# Patient Record
Sex: FEMALE | Race: WHITE | NOT HISPANIC OR LATINO | Employment: UNEMPLOYED | URBAN - NONMETROPOLITAN AREA
[De-identification: names, ages, dates, MRNs, and addresses within clinical notes are randomized per-mention and may not be internally consistent; named-entity substitution may affect disease eponyms.]

---
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
## 2023-04-19 ENCOUNTER — TELEPHONE (OUTPATIENT)
Dept: CARDIOLOGY | Facility: CLINIC | Age: 23
End: 2023-04-19

## 2023-04-19 NOTE — TELEPHONE ENCOUNTER
Caller: hardik maher    Relationship: Mother    Best call back number: [phone]    What is the best time to reach you: ANYTIME- VOICMAIL    Who are you requesting to speak with (clinical staff, provider,  specific staff member): CLINICAL        What was the call regarding: PATIENT HAS NOT BEEN SEEN IN OFFICE SINCE 2021. SHE WAS SEEING TANIA, BUT WOULD LIKE TO BE SEEN BY MICHAEL MUSA. SHE HAS SVT AND HAS RECENTLY BEEN EXPERIENCING SOME MILD SYMPTOMS. PLEASE REACH OUT FOR SCHEDULING.     Do you require a callback: YES

## 2023-11-08 ENCOUNTER — OFFICE VISIT (OUTPATIENT)
Dept: CARDIOLOGY | Facility: CLINIC | Age: 23
End: 2023-11-08
Payer: COMMERCIAL

## 2023-11-08 VITALS
HEIGHT: 60 IN | WEIGHT: 184.2 LBS | HEART RATE: 120 BPM | OXYGEN SATURATION: 98 % | SYSTOLIC BLOOD PRESSURE: 126 MMHG | DIASTOLIC BLOOD PRESSURE: 82 MMHG | BODY MASS INDEX: 36.16 KG/M2

## 2023-11-08 DIAGNOSIS — R00.2 PALPITATIONS: ICD-10-CM

## 2023-11-08 DIAGNOSIS — R06.02 SHORTNESS OF BREATH: ICD-10-CM

## 2023-11-08 DIAGNOSIS — K62.5 BRBPR (BRIGHT RED BLOOD PER RECTUM): ICD-10-CM

## 2023-11-08 DIAGNOSIS — I47.10 PAROXYSMAL SVT (SUPRAVENTRICULAR TACHYCARDIA): ICD-10-CM

## 2023-11-08 PROCEDURE — 1159F MED LIST DOCD IN RCRD: CPT | Performed by: PHYSICIAN ASSISTANT

## 2023-11-08 PROCEDURE — 93000 ELECTROCARDIOGRAM COMPLETE: CPT | Performed by: PHYSICIAN ASSISTANT

## 2023-11-08 PROCEDURE — 1160F RVW MEDS BY RX/DR IN RCRD: CPT | Performed by: PHYSICIAN ASSISTANT

## 2023-11-08 PROCEDURE — 99214 OFFICE O/P EST MOD 30 MIN: CPT | Performed by: PHYSICIAN ASSISTANT

## 2023-11-08 RX ORDER — PHENTERMINE HYDROCHLORIDE 37.5 MG/1
37.5 CAPSULE ORAL EVERY MORNING
COMMUNITY

## 2023-11-08 RX ORDER — OMEPRAZOLE 20 MG/1
20 CAPSULE, DELAYED RELEASE ORAL DAILY
COMMUNITY
Start: 2023-08-13

## 2023-11-08 NOTE — PROGRESS NOTES
Problem list     Subjective   Martina Jarvis is a 22 y.o. female     Chief Complaint   Patient presents with    Follow-up    Rapid Heart Rate     Patient reports is on adipex   Problem list:  1.  Chronic chest pain.  1.1.  Low risk stress test, 7/2020.  2.  SVT.  2.1.  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  3.  Preserved systolic function.  4.  Anxiety.  5.  ADHD.    HPI  The patient presents in to reestablish cardiovascular care.  She has been seen through the clinic historically because of what eventually was diagnosed as SVT.  She eventually had ablation of the same in August, 2020.  She had done well up until recently.  Unfortunately, the patient started Adipex.  Since starting that medication, she has recurrent episodes of palpitations and tachycardia.  She frequently is tachycardic, again primarily since starting the Adipex.  She has no real dizziness nor syncope associated with this.  Her chest pain is minimal now, and basically chronic and unchanged.  Dyspnea is at baseline.  She has no failure symptoms.  She denies further complaints otherwise.  She does tell me that laboratories have been followed closely with her primary care provider and apparently have been benign.    Current Outpatient Medications on File Prior to Visit   Medication Sig Dispense Refill    levonorgestrel-ethinyl estradiol (SEASONALE) 0.15-0.03 MG per tablet Take 1 tablet by mouth Daily.      naproxen (NAPROSYN) 500 MG tablet Take 1 tablet by mouth Every 12 (Twelve) Hours As Needed. for pain      omeprazole (priLOSEC) 20 MG capsule Take 1 capsule by mouth Daily.      phentermine (Adipex-P) 37.5 MG capsule Take 1 capsule by mouth Every Morning.       No current facility-administered medications on file prior to visit.       Patient has no known allergies.    Past Medical History:   Diagnosis Date    Abnormal ECG 4 years ago    ADD (attention deficit disorder)     ADHD     Anxiety     COVID-19 vaccine administered 1st- 08/2021    2nd -  "10/2021 - Moderna     Depression     SVT (supraventricular tachycardia)        Social History     Socioeconomic History    Marital status: Single   Tobacco Use    Smoking status: Never    Smokeless tobacco: Never   Substance and Sexual Activity    Alcohol use: Never    Drug use: Never    Sexual activity: Not Currently     Partners: Male     Birth control/protection: Birth control pill       Family History   Problem Relation Age of Onset    Diabetes Mother     Hypertension Mother     Bipolar disorder Mother     Hypothyroidism Mother     No Known Problems Father        Review of Systems   Constitutional: Negative.  Negative for chills, diaphoresis, fatigue and fever.   HENT: Negative.     Eyes: Negative.  Negative for visual disturbance.   Respiratory: Negative.  Negative for apnea, cough, chest tightness, shortness of breath and wheezing.    Cardiovascular:  Positive for palpitations. Negative for chest pain and leg swelling.   Gastrointestinal:  Positive for blood in stool. Negative for abdominal pain.   Endocrine: Negative.    Genitourinary: Negative.  Negative for hematuria.   Musculoskeletal:  Positive for back pain. Negative for arthralgias, myalgias, neck pain and neck stiffness.   Skin: Negative.  Negative for rash and wound.   Allergic/Immunologic: Negative.  Negative for environmental allergies and food allergies.   Neurological: Negative.  Negative for dizziness, syncope, weakness, light-headedness, numbness and headaches.   Hematological: Negative.  Does not bruise/bleed easily.   Psychiatric/Behavioral: Negative.  Negative for sleep disturbance.        Objective   Vitals:    11/08/23 1529   BP: 126/82   BP Location: Left arm   Patient Position: Sitting   Pulse: 120   SpO2: 98%   Weight: 83.6 kg (184 lb 3.2 oz)   Height: 152.4 cm (60\")      /82 (BP Location: Left arm, Patient Position: Sitting)   Pulse 120   Ht 152.4 cm (60\")   Wt 83.6 kg (184 lb 3.2 oz)   SpO2 98%   BMI 35.97 kg/m²    Lab " Results (most recent)       None          Physical Exam  Vitals and nursing note reviewed.   Constitutional:       General: She is not in acute distress.     Appearance: She is well-developed.   HENT:      Head: Normocephalic and atraumatic.   Eyes:      Conjunctiva/sclera: Conjunctivae normal.      Pupils: Pupils are equal, round, and reactive to light.   Neck:      Vascular: No JVD.      Trachea: No tracheal deviation.   Cardiovascular:      Rate and Rhythm: Normal rate and regular rhythm.      Heart sounds: Normal heart sounds.   Pulmonary:      Effort: Pulmonary effort is normal.      Breath sounds: Normal breath sounds.   Abdominal:      General: Bowel sounds are normal. There is no distension.      Palpations: Abdomen is soft. There is no mass.      Tenderness: There is no abdominal tenderness. There is no guarding or rebound.   Musculoskeletal:         General: No tenderness or deformity. Normal range of motion.      Cervical back: Normal range of motion and neck supple.   Skin:     General: Skin is warm and dry.      Coloration: Skin is not pale.      Findings: No erythema or rash.   Neurological:      Mental Status: She is alert and oriented to person, place, and time.   Psychiatric:         Behavior: Behavior normal.         Thought Content: Thought content normal.         Judgment: Judgment normal.           Procedure     ECG 12 Lead    Date/Time: 11/8/2023 3:34 PM  Performed by: Sabas Rodriguez PA    Authorized by: Sabas Rodriguez PA  Comparison: compared with previous ECG from 6/4/2021             Assessment & Plan      Diagnosis Plan   1. Palpitations  Adult Transthoracic Echo Complete W/ Cont if Necessary Per Protocol    Holter Monitor - 72 Hour Up To 15 Days      2. Paroxysmal SVT (supraventricular tachycardia)  Adult Transthoracic Echo Complete W/ Cont if Necessary Per Protocol    Holter Monitor - 72 Hour Up To 15 Days      3. Shortness of breath  Adult Transthoracic Echo Complete W/ Cont if  Necessary Per Protocol    Holter Monitor - 72 Hour Up To 15 Days      4. BRBPR (bright red blood per rectum)  Ambulatory Referral to Gastroenterology        1.  The patient presents back for repeat evaluation.  She reports increasing palpitations and tachycardia.  EKG today indicates sinus tachycardia.  She frequently has tachycardia when checked at home.  Unfortunately, this basically only started once starting Adipex.  We encouraged her that we would be very cautious to continue Adipex.  She wants to discuss this with her primary care provider however.    2.  I would like to schedule for repeat cardiac evaluation.  We will schedule for 72-hour Holter monitor to evaluate for further rate or rhythm disturbance issues, particularly given history of SVT post ablation.    3.  Echocardiogram will be performed to evaluate LV size and function, valvular morphologies, and cardiac structure otherwise.    4.  For now we will continue medications.  We did encourage discontinuation of Adipex.  Outside of that I would make no adjustments.    5.  As we know study results as above, we can see the patient back and recommend further.  She will call for issues prior to follow-up.    6.  Not noted above, the patient has had bright red blood in bowel movements as of recent.  She has requested referral to GI services.  If okay with her primary care provider, we will attempt to arrange this to expedite care.                 Electronically signed by:

## 2023-12-18 ENCOUNTER — TELEPHONE (OUTPATIENT)
Dept: CARDIOLOGY | Facility: CLINIC | Age: 23
End: 2023-12-18
Payer: COMMERCIAL

## 2023-12-18 DIAGNOSIS — R06.02 SHORTNESS OF BREATH: Primary | ICD-10-CM

## 2023-12-18 DIAGNOSIS — R00.2 PALPITATIONS: ICD-10-CM

## 2023-12-18 DIAGNOSIS — R42 DIZZINESS: ICD-10-CM

## 2023-12-18 DIAGNOSIS — I47.10 PAROXYSMAL SVT (SUPRAVENTRICULAR TACHYCARDIA): ICD-10-CM

## 2023-12-18 NOTE — TELEPHONE ENCOUNTER
Caller: Martina Jarvis    Relationship: Self    Best call back number: [phone]     What is the best time to reach you: ANYTIME     What was the call regarding: PT IS WONDERING IF THE OFFICE HAS RECEIVED HER BLOOD WORK AND X RAYS RESULTS FROM [facility]. PLEASE ADVISE. STATES A FEW MINS BEFORE SHE CALLED SHE FELT DIZZY AND HAD SOB. FELT AS IF SHE WOULD PASS OUT OF HAVE A HEART ATTACK- STATES THIS ISN'T ACTIVE AS OF PHONE CALL.     Is it okay if the provider responds through MyChart: CALL

## 2023-12-18 NOTE — TELEPHONE ENCOUNTER
Hub staff attempted to follow warm transfer process and was unsuccessful     Caller: Martina Jarvis     Relationship to patient: Self     Best call back number: [phone]     Patient is needing: PT STATES SHE CAN'T WAIT FOR HER ECHO, NEEDS THIS TODAY. STATES LIKE SHE FEELS LIKE SHE IS GOING TO DIE OR HAVE A HEART ATTACK. HER HEART STOPS AND STARTS  BEATING AGAIN. PT HAS BEEN COUGHING A LOT . HAVING SOB. PT DOESN'T KNOW IF SHE HAS HAD THIS BEFORE, STATES SHE HAS HAD TACHYCARDIA IN THE PAST. PT WILL NOT GO TO THE ER. TRANSFERRED PT TO THE [facility] TEAM. NO ANSWER AT THE [facility]. TRANSFERRED PT TO VOICEMAIL DUE TO NO ANSWER AND IT FORCING US TO LEAVE A MESSAGE.

## 2023-12-18 NOTE — TELEPHONE ENCOUNTER
Patient stated that she never received a call about her monitor results. Patient would like a call.

## 2023-12-18 NOTE — TELEPHONE ENCOUNTER
She feels like her heart has stops for a few seconds . She has cough and symptoms she went to the DR and they refused to test her for Covid. She feels like she can not breath. She wants testing done ASAP. She feels like she needs to see someone today feels like it is a emergency. She is having pain when she coughs or clears her throat.

## 2023-12-18 NOTE — TELEPHONE ENCOUNTER
I spoke with Sabas OQUENDO he would like for the Patient to get  CBC CMP D-Dimer and a chest x-ray. I called patient and had to leave a message .

## 2023-12-18 NOTE — TELEPHONE ENCOUNTER
I called patient and let her know that over all her labs were ok . I also let patient know that according to Chest x ray no lung disease or pleural disease. I will call her tomorrow if I get her Echo report.

## 2023-12-18 NOTE — TELEPHONE ENCOUNTER
Caller: Martina Jarvis    Relationship: Self    Best call back number: [phone]    What orders are you requesting (i.e. lab or imaging):XRAY WORK ORDERS    In what timeframe would the patient need to come in: ASAP    Where will you receive your lab/imaging services: [facility]     Additional notes: PATIENT HAS HAD BLOOD WORK DONE HOWEVER THE XRAY ORDERS WERE NOT SIGNED AND NEED GALEN FAXED OVER [phone]

## 2023-12-19 ENCOUNTER — TELEPHONE (OUTPATIENT)
Dept: CARDIOLOGY | Facility: CLINIC | Age: 23
End: 2023-12-19
Payer: COMMERCIAL

## 2023-12-19 NOTE — TELEPHONE ENCOUNTER
Notified pt of her results and AZRA Vazquez's recommendations. Pt verbalizes understanding and is aware that we will call when we receive the rest of her results. Pt is aware to call with any questions or concerns.      Faxed results to pcp     ----- Message from Carolin Lara Rep sent at 12/19/2023 10:02 AM EST -----    ----- Message -----  From: Sabas Rodriguez PA  Sent: 12/18/2023   7:36 PM EST  To: Hawa Bobo MA    No acute findings.  Routine follow-up.  ----- Message -----  From: Shannon Peguero RegSched Rep  Sent: 12/18/2023   1:47 PM EST  To: AZRA Rodriguez

## 2023-12-21 ENCOUNTER — NURSE TRIAGE (OUTPATIENT)
Dept: CALL CENTER | Facility: HOSPITAL | Age: 23
End: 2023-12-21
Payer: COMMERCIAL

## 2023-12-21 ENCOUNTER — TELEPHONE (OUTPATIENT)
Dept: CARDIOLOGY | Facility: CLINIC | Age: 23
End: 2023-12-21
Payer: COMMERCIAL

## 2023-12-21 NOTE — TELEPHONE ENCOUNTER
Called patient and advised she proceed to the ER for immediate evaluation and treatment due to symptoms and sounding as if she was in distress. She reported O2 at 70. She was seen last week at [facility] with Covid. She would like to be seen today by Sabas Rodriguez PA-C as Sabas told her she can come in anytime to be seen. Explained Sabas is currently  out of the office. Tried explaining she proceed to the ER however she just kept explaining why she did not like [facility]. I urged she be treated at any ER not necessarily [facility], she can go to any hospital she chooses. She continued to discuss being exposed to Covid again and how her friend should have told her. Explained I hated what she was going through unfortunately she needed to go to the ER for treatment. Phone was then disconnected.

## 2023-12-21 NOTE — TELEPHONE ENCOUNTER
"Reason for Disposition   Dizziness, lightheadedness, or weakness    Additional Information   Negative: Passed out (i.e., lost consciousness, collapsed and was not responding)   Negative: Shock suspected (e.g., cold/pale/clammy skin, too weak to stand, low BP, rapid pulse)   Negative: Difficult to awaken or acting confused (e.g., disoriented, slurred speech)   Negative: Visible sweat on face or sweat dripping down face   Negative: Unable to walk, or can only walk with assistance (e.g., requires support)   Negative: Received SHOCK from implantable cardiac defibrillator and has persisting symptoms (i.e., palpitations, lightheadedness)   Negative: Dizziness, lightheadedness, or weakness and heart beating very rapidly (e.g., > 140 / minute)   Negative: Dizziness, lightheadedness, or weakness and heart beating very slowly (e.g., < 50 / minute)   Negative: Sounds like a life-threatening emergency to the triager   Negative: Chest pain   Negative: Difficulty breathing   Negative: Heart beating very rapidly (e.g., > 140 / minute) and present now  (Exception: During exercise.)   Negative: Heart beating very slowly (e.g., < 50 / minute)  (Exception: Athlete and heart rate normal for caller.)   Negative: New or worsened shortness of breath with activity (dyspnea on exertion)   Negative: Patient sounds very sick or weak to the triager   Negative: Wearing a 'Holter monitor' or 'cardiac event monitor'   Negative: Received SHOCK from implantable cardiac defibrillator (and now feels well)   Negative: Heart beating very rapidly (e.g., > 140 / minute) and not present now  (Exception: During exercise.)   Negative: Skipped or extra beat(s) and increases with exercise or exertion   Negative: Skipped or extra beat(s) and occurs 4 or more times per minute    Answer Assessment - Initial Assessment Questions  1. DESCRIPTION: \"Please describe your heart rate or heartbeat that you are having\" (e.g., fast/slow, regular/irregular, skipped or " "extra beats, \"palpitations\")      Hx of PSVT. HR today  . BP  135/116 reported by caller. She is hyperventilating on the phone.  States her SPO2 pulse ox dropped to 70% and then it come up to 90's then drops back down.  Refusing ER states they do not help her.  Needs to speak with cardiology and need them to call her ASAP.   2. ONSET: \"When did it start?\" (Minutes, hours or days)       Since she got up today, but see caller having multiple calls to office with similar complaints over the past few days.    3. DURATION: \"How long does it last\" (e.g., seconds, minutes, hours)      minutes  4. PATTERN \"Does it come and go, or has it been constant since it started?\"  \"Does it get worse with exertion?\"   \"Are you feeling it now?\"      intermittent  5. TAP: \"Using your hand, can you tap out what you are feeling on a chair or table in front of you, so that I can hear?\" (Note: not all patients can do this)        Regular at present  6. HEART RATE: \"Can you tell me your heart rate?\" \"How many beats in 15 seconds?\"  (Note: not all patients can do this)         120   7. RECURRENT SYMPTOM: \"Have you ever had this before?\" If Yes, ask: \"When was the last time?\" and \"What happened that time?\"       Yes she has known PSVT  8. CAUSE: \"What do you think is causing the palpitations?\"      PSVT  9. CARDIAC HISTORY: \"Do you have any history of heart disease?\" (e.g., heart attack, angina, bypass surgery, angioplasty, arrhythmia)       Called her doctor 12/18 and stated she felt like she was going to die or have a heart attack. .   10. OTHER SYMPTOMS: \"Do you have any other symptoms?\" (e.g., dizziness, chest pain, sweating, difficulty breathing)        ADHD, Anxiety disorder  11. PREGNANCY: \"Is there any chance you are pregnant?\" \"When was your last menstrual period?\"       Denies    Protocols used: Heart Rate and Heartbeat Questions-ADULT-[place]    "

## 2023-12-21 NOTE — TELEPHONE ENCOUNTER
I called and had to leave a message on voicemail . I advised based on blood pressure and heart rate patient will need to go to the ER.I advised that she may need medications to bring down blood pressure that we can not give in the office. She will also need O2 levels monitored if they are dropping.

## 2023-12-21 NOTE — TELEPHONE ENCOUNTER
Caller: Martina Jarvis    Relationship: Self    Best call back number: [phone]    Caller requesting test results: MARTINA JARVIS    What test was performed: ECHO AND D-DIMER LAB RESULTS     When was the test performed: 12-19 AND BLOOD WORK 12-18-23    Where was the test performed: [facility] AND [facility]    Additional notes: PATIENT CALLED UPSET THAT SOMEONE SHE HAS BEEN AROUND HAS TESTED POSITIVE FOR COVID AND IS WORRIED ABOUT GETTING SICK.  PLEASE REACH OUT WHEN RESULTS ARE AVAILABLE.

## 2023-12-21 NOTE — TELEPHONE ENCOUNTER
Hub staff attempted to follow warm transfer process and was unsuccessful     Caller: Martina Jarvis    Relationship to patient: Self    Best call back number: [phone]    Patient is needing: PT STATES SHE IS NOT GOING TO THE HOS, IS REQUESTING TO BE SEEN TODAY. STATES THE OXYGEN LEVEL KEEPS JUMPING AND HER BP /116 PULSE 120. CALLED OFFICE, NO ANSWER. CALLED [facility] SINCE THE PT REFUSED TO GO TO THE ER. PLEASE ADVISE.

## 2023-12-21 NOTE — TELEPHONE ENCOUNTER
Left message that d-dimer was normal however it does not show an echo being done, only other testing was chest x ray. Echo was ordered by Sabas Rodriguez PA-C on 11/8/23. Chart shows cancelled. If echo was done at [facility] the results are not available at this time.     Patient returned call and notified of above. She does not see why we can not get the echo results. Advised she call [facility] and have echo faxed when available.

## 2023-12-21 NOTE — TELEPHONE ENCOUNTER
Caller: Martina Jarvis    Relationship: Self    Best call back number: [phone]    What is the best time to reach you: ANY    What was the call regarding: PATIENT ASKING WHAT LEVELS MEAN ON A PULSE OXY METER. PLEASE CALL ASAP SHE SOUNDS VERY CONCERNED    Is it okay if the provider responds through MyChart: NO

## 2023-12-22 ENCOUNTER — OFFICE VISIT (OUTPATIENT)
Dept: CARDIOLOGY | Facility: CLINIC | Age: 23
End: 2023-12-22
Payer: COMMERCIAL

## 2023-12-22 VITALS — OXYGEN SATURATION: 98 % | DIASTOLIC BLOOD PRESSURE: 82 MMHG | HEART RATE: 88 BPM | SYSTOLIC BLOOD PRESSURE: 122 MMHG

## 2023-12-22 DIAGNOSIS — R00.0 TACHYCARDIA: ICD-10-CM

## 2023-12-22 DIAGNOSIS — R00.2 PALPITATIONS: Primary | ICD-10-CM

## 2023-12-22 DIAGNOSIS — R06.02 SHORTNESS OF BREATH: ICD-10-CM

## 2023-12-22 PROCEDURE — 99213 OFFICE O/P EST LOW 20 MIN: CPT | Performed by: PHYSICIAN ASSISTANT

## 2023-12-22 PROCEDURE — 1159F MED LIST DOCD IN RCRD: CPT | Performed by: PHYSICIAN ASSISTANT

## 2023-12-22 PROCEDURE — 1160F RVW MEDS BY RX/DR IN RCRD: CPT | Performed by: PHYSICIAN ASSISTANT

## 2023-12-22 RX ORDER — CEFDINIR 300 MG/1
300 CAPSULE ORAL 2 TIMES DAILY
COMMUNITY
Start: 2023-12-13

## 2023-12-22 NOTE — PROGRESS NOTES
Problem list     Subjective   Martina Jarvis is a 23 y.o. female     Chief Complaint   Patient presents with    Palpitations   Problem list:  1.  Chronic chest pain.  1.1.  Low risk stress test, 7/2020.  2.  SVT.  2.1.  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  3.  Preserved systolic function.  4.  Anxiety.  5.  ADHD.    HPI  The patient presents in clinic today at her request.  She was seen at last evaluation because of recurrent episodes of palpitations and tachycardia.  At that time, Holter monitor was performed.  Holter suggested sinus rhythm with intermittent episodes of sinus tachycardia, PACs, and PVCs.  No significant nor sustained dysrhythmic activity was noted.  She was scheduled for an echocardiogram as well.  Echocardiogram was performed at the hospital.  This indicated preserved systolic function with no significant valvular nor structural abnormalities.  At this time, the patient does not feel well.  She has ongoing episodes of tachycardia and palpitations.  She is very concerned that this is reflective of SVT which has been noticed in the past.  She is very concerned however because she did not have symptoms during her Holter monitor.  She would now like to pursue further evaluation.  She would like to discuss an event monitor as she has had this in the past.  She did have laboratories, where CBC, CMP, and D-dimer findings were benign.  She tells me that recent thyroid studies have been benign as well.  She has no further complaints and presents for further evaluation because of ongoing symptoms.    Current Outpatient Medications on File Prior to Visit   Medication Sig Dispense Refill    cefdinir (OMNICEF) 300 MG capsule Take 1 capsule by mouth 2 (Two) Times a Day.      levonorgestrel-ethinyl estradiol (SEASONALE) 0.15-0.03 MG per tablet Take 1 tablet by mouth Daily.       No current facility-administered medications on file prior to visit.       Patient has no known allergies.    Past Medical History:    Diagnosis Date    Abnormal ECG 4 years ago    ADD (attention deficit disorder)     ADHD     Anxiety     COVID-19 12/08/2023    COVID-19 vaccine administered 1st- 08/2021    2nd - 10/2021 - Moderna     Depression     SVT (supraventricular tachycardia)        Social History     Socioeconomic History    Marital status: Single   Tobacco Use    Smoking status: Never    Smokeless tobacco: Never   Substance and Sexual Activity    Alcohol use: Never    Drug use: Never    Sexual activity: Not Currently     Partners: Male     Birth control/protection: Birth control pill       Family History   Problem Relation Age of Onset    Diabetes Mother     Hypertension Mother     Bipolar disorder Mother     Hypothyroidism Mother     No Known Problems Father        Review of Systems   Constitutional:  Positive for fatigue. Negative for chills, diaphoresis and fever.   HENT: Negative.     Eyes: Negative.  Negative for visual disturbance.   Respiratory:  Negative for apnea, cough, chest tightness, shortness of breath and wheezing.    Cardiovascular:  Positive for palpitations. Negative for chest pain.   Gastrointestinal: Negative.  Negative for abdominal pain and blood in stool.   Endocrine: Negative.    Genitourinary: Negative.  Negative for hematuria.   Musculoskeletal: Negative.  Negative for arthralgias, back pain, myalgias, neck pain and neck stiffness.   Skin: Negative.  Negative for rash and wound.   Allergic/Immunologic: Negative.  Negative for environmental allergies and food allergies.   Neurological:  Positive for light-headedness. Negative for dizziness, syncope, weakness and headaches.   Hematological: Negative.  Does not bruise/bleed easily.   Psychiatric/Behavioral: Negative.  Negative for sleep disturbance.        Objective   Vitals:    12/22/23 1042   BP: 122/82   Pulse: 88   SpO2: 98%      /82   Pulse 88   SpO2 98%    Lab Results (most recent)       None          Physical Exam  Vitals and nursing note reviewed.    Constitutional:       General: She is not in acute distress.     Appearance: She is well-developed.   HENT:      Head: Normocephalic and atraumatic.   Eyes:      Conjunctiva/sclera: Conjunctivae normal.      Pupils: Pupils are equal, round, and reactive to light.   Neck:      Vascular: No JVD.      Trachea: No tracheal deviation.   Cardiovascular:      Rate and Rhythm: Normal rate and regular rhythm.      Heart sounds: Normal heart sounds.   Pulmonary:      Effort: Pulmonary effort is normal.      Breath sounds: Normal breath sounds.   Abdominal:      General: Bowel sounds are normal. There is no distension.      Palpations: Abdomen is soft. There is no mass.      Tenderness: There is no abdominal tenderness. There is no guarding or rebound.   Musculoskeletal:         General: No tenderness or deformity. Normal range of motion.      Cervical back: Normal range of motion and neck supple.   Skin:     General: Skin is warm and dry.      Coloration: Skin is not pale.      Findings: No erythema or rash.   Neurological:      Mental Status: She is alert and oriented to person, place, and time.   Psychiatric:         Behavior: Behavior normal.         Thought Content: Thought content normal.         Judgment: Judgment normal.           Procedure   Procedures       Assessment & Plan      Diagnosis Plan   1. Palpitations  Cardiac Event Monitor      2. Shortness of breath        3. Tachycardia          1.  At this time, the patient presents for further evaluation.  She is very concerned about ongoing tachycardia and palpitations.  This occurs more frequently than what she has noted in the past.  Symptoms are becoming very disturbing for the patient.  We did recently do a Holter monitor which indicated no significant rate nor rhythm disturbance issues.  She is not satisfied with this however because she had no reproduction of symptoms of any significance while wearing the Holter.  She will be scheduled for an extended event  monitor to evaluate for any potential dysrhythmic activity.  Her main concern is SVT, as this has been an issue for her in the past.    2.  As we can review event monitor findings, we can recommend her further.    3.  Recent echocardiogram findings were very much benign, as above.  Laboratories that I have available have been unremarkable as well.  We have requested all recent laboratories.    4.  For recurrent issues, she will proceed onto the emergency room.  As we can review the above, we can recommend her further.                            Electronically signed by:

## 2023-12-27 DIAGNOSIS — R00.2 PALPITATIONS: ICD-10-CM

## 2023-12-27 DIAGNOSIS — I47.10 PAROXYSMAL SVT (SUPRAVENTRICULAR TACHYCARDIA): ICD-10-CM

## 2023-12-27 DIAGNOSIS — R06.02 SHORTNESS OF BREATH: ICD-10-CM

## 2024-01-02 ENCOUNTER — TELEPHONE (OUTPATIENT)
Dept: CARDIOLOGY | Facility: CLINIC | Age: 24
End: 2024-01-02
Payer: COMMERCIAL

## 2024-01-02 NOTE — TELEPHONE ENCOUNTER
----- Message from Carolin Lara sent at 1/2/2024 10:01 AM EST -----    ----- Message -----  From: Melina Judd APRN  Sent: 1/1/2024   3:17 PM EST  To: Hong Newby [facility]    No significant findings on echo.  Event monitor pending.

## 2024-01-02 NOTE — TELEPHONE ENCOUNTER
Called and informed pt of results, she stated she has not worn monitor yet. She was concerned about getting it wet, I explained that she can get it wet, just not submerge it. Pt stated she would get it put on.

## 2024-01-03 ENCOUNTER — TELEPHONE (OUTPATIENT)
Dept: CARDIOLOGY | Facility: CLINIC | Age: 24
End: 2024-01-03
Payer: COMMERCIAL

## 2024-01-03 NOTE — TELEPHONE ENCOUNTER
----- Message from Carolin Lara Rep sent at 1/3/2024 10:51 AM EST -----    ----- Message -----  From: Meg Gleason RegSched Rep  Sent: 12/27/2023   3:26 PM EST  To: CHRISTEL Ovalle      ----- Message -----  From: Sabas Rodriguez PA  Sent: 12/26/2023  10:38 AM EST  To: [facility]ees[facility]    D-dimer unremarkable.  ----- Message -----  From: Shannon Peguero RegSched Rep  Sent: 12/18/2023   3:11 PM EST  To: AZRA Rodriguez

## 2024-01-25 ENCOUNTER — TELEPHONE (OUTPATIENT)
Dept: UROLOGY | Facility: CLINIC | Age: 24
End: 2024-01-25
Payer: COMMERCIAL

## 2024-01-25 NOTE — TELEPHONE ENCOUNTER
Caller: VIKY ROBLES    Relationship: SELF    Best call back number: [phone]    What is your medical concern? PT SAID SHE IS HAVING PAIN AGAIN ASKING TO GET IN IMMEDIATELY NEXT WEEK. PT SAID SHE IS VERY WORRIED ABOUT WHAT COULD BE WRONG WITH HER.    How long has this issue been going on? A MONTH    Is your provider already aware of this issue? NO    Have you been treated for this issue? NO

## 2024-01-29 ENCOUNTER — TELEPHONE (OUTPATIENT)
Dept: CARDIOLOGY | Facility: CLINIC | Age: 24
End: 2024-01-29
Payer: COMMERCIAL

## 2024-01-29 DIAGNOSIS — G47.39 SLEEP APNEA-LIKE BEHAVIOR: Primary | ICD-10-CM

## 2024-01-29 NOTE — TELEPHONE ENCOUNTER
Patient notified of recommendation's, (patient reports that she has her monitor that was last ordered and has not worn it yet). Patient notified of recommendation's and request to be referred to Dr. Garcia for sleep apnea evaluation. Order placed.

## 2024-01-29 NOTE — TELEPHONE ENCOUNTER
----- Message from AZRA Rodriguez sent at 1/29/2024  9:07 AM EST -----  As per recommendations.  ----- Message -----  From: Los Newby MD  Sent: 1/26/2024   5:35 PM EST  To: AZRA Rodriguez    Mobile Cardiac Outpatient Telemetry  Order: [id number]  Status: Final result       Visible to patient: Yes (not seen)       Dx: Palpitations    0 Result Notes  Details    Reading Physician Reading Date Result Priority   Los Newby MD  [phone] 1/26/2024 Routine     Result Text  1.  Sinus rhythm is the baseline predominant rhythm throughout the study.  2.  An episode of nonsustained V. tach was noted at 8:33 AM on 1/7/2024.  It is unclear if this correlates to sleep hours for the patient overnight, but if the patient was possibly sleeping, consider evaluation of sleep apnea.  The episode of nonsustained V. tach persisted for 8 beats at a rate of 172 bpm.  3.  No other arrhythmia, ectopy, or block of significance was noted during the study.  4.  No specific symptoms were reported during the study, however there were 2 manually detected events noted during the study.  Both occurred during sinus tachycardia.         .  > WC- bound. PPSV 40%  > Pls assist with ADL's  > Skin care as per protocol

## 2024-01-30 ENCOUNTER — TELEPHONE (OUTPATIENT)
Dept: CARDIOLOGY | Facility: CLINIC | Age: 24
End: 2024-01-30

## 2024-01-30 NOTE — TELEPHONE ENCOUNTER
Caller: Martina Jarvis    Relationship: Self    Best call back number: [phone]     What is the best time to reach you: ANYTIME     Who are you requesting to speak with (clinical staff, provider,  specific staff member): CLINICAL     What was the call regarding: PT WAS TOLD SHE HAS SLEEP APNEA, SHE WAS UPSET AND CRYING LAST NIGHT, IS REQUESTING TO SPEAK WITH A NURSE TODAY. STATES SHE KNOWS THAT SHE DOES NOT HAVE SLEEP APNEA. IS REQUESTING TO RE WEAR THE HEART MONITOR AGAIN. PT HAD COVID WHEN SHE WORE THE MONITOR, THINKS THIS IS WHY THIS IS WHY THIS SHOWED UP.     Is it okay if the provider responds through MyChart: CALL

## 2024-01-30 NOTE — TELEPHONE ENCOUNTER
Called patient and explained I was in the room when she spoke to Sabas' nurse regarding monitor results and referral. Patient was not diagnosed with sleep apnea and was not told she has sleep apnea. She was told she is being referred to a pulmonologist for sleep apnea evaluation based on monitor results and symptoms. Having Covid while wearing the monitor does not change the fact that she needs pulmonologist evaluation based on results.     She was sent another monitor as requested but never donned the monitor and advised to return as we were able to obtain enough information from the monitor she did wear.

## 2024-02-01 ENCOUNTER — TELEPHONE (OUTPATIENT)
Dept: CARDIOLOGY | Facility: CLINIC | Age: 24
End: 2024-02-01
Payer: COMMERCIAL

## 2024-02-01 NOTE — TELEPHONE ENCOUNTER
Caller: Martina Jarvis    Relationship to patient: Self    Best call back number: [phone]    Patient is needing: IS CARDIAC CLEARANCE FOR HER UPCOMING PROCEDURES PLEASE ADVISE THANK YOU

## 2024-02-02 NOTE — TELEPHONE ENCOUNTER
Patient notified to contact surgeon's office to have clearance faxed. She states she has been unable to reach Dr Bender's office (urology). Called Dr Bender's office to have clearance faxed. Was advised clearance was not required with cystoscopy.

## 2024-02-07 ENCOUNTER — TELEPHONE (OUTPATIENT)
Dept: CARDIOLOGY | Facility: CLINIC | Age: 24
End: 2024-02-07
Payer: COMMERCIAL

## 2024-02-07 NOTE — TELEPHONE ENCOUNTER
Called to speak with the pt and verify if I can be of assistance in addressing her concerns with AZRA Vazquez since he is performing patient care and unable to speak via phone at this time.  Pt reports she is scheduled for [facility] B[facility], she reports only taking birth control and probiotics and is also scheduled for a Colonscopy on 2/28/24 @ [facility] by  and she was needing to confirm if she is okay to proceed with these procedures.    Spoke with AZRA Vazquez and he states from a cardiac perspective there is not indication the patient is at an increased risk for either of these procedures.  Notified pt, repeated and verified.

## 2024-02-07 NOTE — TELEPHONE ENCOUNTER
Caller: Martina Jarvis    Relationship: Self    Best call back number: [phone]     What is the best time to reach you: ANYTIME     Who are you requesting to speak with (clinical staff, provider,  specific staff member): AZRA RUCKER     What was the call regarding: CALLED THIS WEEK ABOUT THIS TWICE, STATES SHE DOESN'T FEEL AS IF OUR OFFICE IS CONCERNED WITH HER NEEDS.     STATES SHE IS HAVING SURG NEXT WEEK, REQUESTING A CALL BACK FROM AZRA MATTHEWS SPECIFICALLY TO DISCUSS HER BEING CLEAR FOR THIS.     DR DIDN'T REQUEST CLEARANCE, PT JUST WANTS TO MAKE SURE SHE IS OKAY. PLEASE ADVISE PT ASAP.     Is it okay if the provider responds through MyChart: CALL

## 2024-04-04 ENCOUNTER — TELEPHONE (OUTPATIENT)
Dept: CARDIOLOGY | Facility: CLINIC | Age: 24
End: 2024-04-04
Payer: COMMERCIAL

## 2024-04-04 NOTE — TELEPHONE ENCOUNTER
Called patient who reports started contrave for weight loss and is on zofran and was told by her mother that zofran should not be taken every day due to can affect the heart, advised patient to contact provider whom prescribed zofran and contrave. She reports increase in heart rate when exercising, instructed patient that when exercising heart rate will go up and if it is bothersome she may want to do more low intensity exercises. Verbalizes understanding.

## 2024-04-04 NOTE — TELEPHONE ENCOUNTER
Caller: Martina Jarvis    Relationship to patient: Self    Best call back number: [phone]    Patient is needing: TO DISCUSS WEIGHT LOSS RX'S, WHILE TAKING ZOFRAN WITH IT CAN CAUSE PROBLEMS CAN SHE TAKE THIS RX EVERYDAY? PT STATES HER HEART HAS BEEN RACING SINCE EXERCISING NOW. PLEASE ADVISE THANK YOU

## 2024-07-10 ENCOUNTER — OFFICE VISIT (OUTPATIENT)
Dept: CARDIOLOGY | Facility: CLINIC | Age: 24
End: 2024-07-10
Payer: COMMERCIAL

## 2024-07-10 VITALS
SYSTOLIC BLOOD PRESSURE: 126 MMHG | DIASTOLIC BLOOD PRESSURE: 81 MMHG | WEIGHT: 197.6 LBS | HEIGHT: 60 IN | HEART RATE: 100 BPM | BODY MASS INDEX: 38.79 KG/M2 | OXYGEN SATURATION: 99 %

## 2024-07-10 DIAGNOSIS — R06.02 SHORTNESS OF BREATH: ICD-10-CM

## 2024-07-10 DIAGNOSIS — R00.0 TACHYCARDIA: ICD-10-CM

## 2024-07-10 DIAGNOSIS — R00.2 PALPITATIONS: Primary | ICD-10-CM

## 2024-07-10 PROCEDURE — 1159F MED LIST DOCD IN RCRD: CPT | Performed by: PHYSICIAN ASSISTANT

## 2024-07-10 PROCEDURE — 1160F RVW MEDS BY RX/DR IN RCRD: CPT | Performed by: PHYSICIAN ASSISTANT

## 2024-07-10 PROCEDURE — 99213 OFFICE O/P EST LOW 20 MIN: CPT | Performed by: PHYSICIAN ASSISTANT

## 2024-07-10 RX ORDER — FAMOTIDINE 20 MG/1
20 TABLET, FILM COATED ORAL 2 TIMES DAILY
COMMUNITY
Start: 2024-06-18

## 2024-07-10 NOTE — PROGRESS NOTES
Problem list     Subjective   Martina Jarvis is a 23 y.o. female     Chief Complaint   Patient presents with    Follow-up     8 month follow up for echo and monitor result's, patient did not wear monitor and echo done at [facility] results are scanned into chart.    Problem list:  1.  Chronic chest pain.  1.1.  Low risk stress test, 7/2020.  2.  SVT.  2.1.  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  3.  Preserved systolic function.  4.  Anxiety.  5.  ADHD.    HPI  The patient presents in clinic today for routine evaluation and follow-up.  She has been seen historically given cardiovascular history as above.  She was evaluated recently because of increasing palpitations, tachycardia, and symptoms otherwise.  She eventually had an echo and a monitor.  Echo indicated normal systolic function with no significant valvular nor structural abnormalities.  Monitor indicated sinus rhythm throughout, short episode of nonsustained V. tach, no significant rate nor rhythm disturbance issues otherwise.  She still has palpitations and tachycardia, typically associated with anxiety.  Chest pain is now rare.  She has stable dyspnea.  She has no further complaints.  For the most part, the patient feels that she is doing well from general cardiovascular standpoint.    Current Outpatient Medications on File Prior to Visit   Medication Sig Dispense Refill    famotidine (PEPCID) 20 MG tablet Take 1 tablet by mouth 2 (Two) Times a Day.      levonorgestrel-ethinyl estradiol (SEASONALE) 0.15-0.03 MG per tablet Take 1 tablet by mouth Daily.      [DISCONTINUED] cefdinir (OMNICEF) 300 MG capsule Take 1 capsule by mouth 2 (Two) Times a Day.       No current facility-administered medications on file prior to visit.       Patient has no known allergies.    Past Medical History:   Diagnosis Date    Abnormal ECG 4 years ago    ADD (attention deficit disorder)     ADHD     Anxiety     COVID-19 12/08/2023    COVID-19 vaccine administered 1st- 08/2021    2nd -  "10/2021 - Moderna     Depression     SVT (supraventricular tachycardia)        Social History     Socioeconomic History    Marital status: Single   Tobacco Use    Smoking status: Never    Smokeless tobacco: Never   Substance and Sexual Activity    Alcohol use: Never    Drug use: Never    Sexual activity: Not Currently     Partners: Male     Birth control/protection: Birth control pill       Family History   Problem Relation Age of Onset    Diabetes Mother     Hypertension Mother     Bipolar disorder Mother     Hypothyroidism Mother     No Known Problems Father        Review of Systems   Constitutional:  Positive for fatigue. Negative for chills, diaphoresis and fever.   Eyes: Negative.  Negative for visual disturbance.   Respiratory: Negative.  Negative for apnea, cough, chest tightness, shortness of breath and wheezing.    Cardiovascular: Negative.  Negative for chest pain, palpitations and leg swelling.   Gastrointestinal:  Positive for abdominal pain, blood in stool and constipation.   Endocrine: Negative.    Genitourinary: Negative.  Negative for hematuria.   Musculoskeletal:  Positive for back pain. Negative for arthralgias, myalgias, neck pain and neck stiffness.   Skin: Negative.  Negative for rash and wound.   Allergic/Immunologic: Positive for environmental allergies (seasonal). Negative for food allergies.   Neurological:  Positive for light-headedness and headaches. Negative for dizziness, syncope, weakness and numbness.   Hematological: Negative.  Does not bruise/bleed easily.   Psychiatric/Behavioral:  Positive for sleep disturbance (idiopathic hypersomnia).        Objective   Vitals:    07/10/24 1538   BP: 126/81   Pulse: 100   SpO2: 99%   Weight: 89.6 kg (197 lb 9.6 oz)   Height: 152.4 cm (60\")      /81   Pulse 100   Ht 152.4 cm (60\")   Wt 89.6 kg (197 lb 9.6 oz)   SpO2 99%   BMI 38.59 kg/m²    Lab Results (most recent)       None          Physical Exam  Vitals and nursing note reviewed. "   Constitutional:       General: She is not in acute distress.     Appearance: She is well-developed.   HENT:      Head: Normocephalic and atraumatic.   Eyes:      Conjunctiva/sclera: Conjunctivae normal.      Pupils: Pupils are equal, round, and reactive to light.   Neck:      Vascular: No JVD.      Trachea: No tracheal deviation.   Cardiovascular:      Rate and Rhythm: Normal rate and regular rhythm.      Heart sounds: Normal heart sounds.   Pulmonary:      Effort: Pulmonary effort is normal.      Breath sounds: Normal breath sounds.   Abdominal:      General: Bowel sounds are normal. There is no distension.      Palpations: Abdomen is soft. There is no mass.      Tenderness: There is no abdominal tenderness. There is no guarding or rebound.   Musculoskeletal:         General: No tenderness or deformity. Normal range of motion.      Cervical back: Normal range of motion and neck supple.   Skin:     General: Skin is warm and dry.      Coloration: Skin is not pale.      Findings: No erythema or rash.   Neurological:      Mental Status: She is alert and oriented to person, place, and time.   Psychiatric:         Behavior: Behavior normal.         Thought Content: Thought content normal.         Judgment: Judgment normal.           Procedure   Procedures       Assessment & Plan      Diagnosis Plan   1. Palpitations        2. Tachycardia        3. Shortness of breath            1.  At this time, the patient appears to be doing fairly well from general cardiovascular standpoint.  She continues to have palpitations and tachycardia.  Prior event monitor findings were largely benign, as above.  Echo findings also have indicated normal systolic function with no significant valvular nor structural abnormalities.  Findings of the above studies have been discussed in detail with the patient.    2.  I did offer consideration for institution of suppressive therapies given ongoing palpitations and tachycardia.  She is very  reticent for consideration of medications.  She will call should she change her mind on that.    3.  We otherwise discussed ongoing lifestyle modifications.  As the patient is stable, nothing further for now unless clinical course or change.  She will return immediately for complications.  We will see her routinely through the clinic.  Nothing further.                 Electronically signed by:

## 2024-07-26 ENCOUNTER — TELEPHONE (OUTPATIENT)
Dept: CARDIOLOGY | Facility: CLINIC | Age: 24
End: 2024-07-26
Payer: COMMERCIAL

## 2024-07-26 NOTE — TELEPHONE ENCOUNTER
Caller: Martina Jarvis    Relationship: Self    Best call back number: [phone]    What is the best time to reach you: ANYTIME    Who are you requesting to speak with (clinical staff, provider,  specific staff member): CLINICAL     Do you know the name of the person who called: N/A    What was the call regarding: PATIENT HAS CONCERNS ABOUT HER MEDICATIONS. PLEASE REACH OUT.  PATIENT DID START THE NEW MEDICATION SHE HAD SPOKE TO AZRA RUCKER ABOUT. PROVIGIL 100 MG DAILY IN MORNING. PATIENT WANTS TO SPEAK TO AZRA RUCKER ABOUT MEDICATION SHE WANTS TO TAKE AS WELL.    Is it okay if the provider responds through MyChart: CALL

## 2024-07-26 NOTE — TELEPHONE ENCOUNTER
"Called patient who reports started provigil 100 mg, reports heart rate 96 denies any palpitation's or dizziness or tachycardia. Patient wanted you to know and see if you wanted to proceed with beta blocker and also wants to know if provigil, risperidal and whatever beta blocker you would like for her to have if there is any interaction, she does not want to \"damage any of her organs\".  "

## 2024-07-30 NOTE — TELEPHONE ENCOUNTER
Chief complaint:   Chief Complaint   Patient presents with   • Wrist Pain     Room 4        Vitals:  Visit Vitals  /70 (BP Location: LUE - Left upper extremity, Patient Position: Sitting, Cuff Size: Regular)   Pulse 78   Temp 97.8 °F (36.6 °C) (Oral)   Resp 18   Ht 5' 4\" (1.626 m)   Wt 72.6 kg (160 lb)   SpO2 98%   BMI 27.46 kg/m²       HISTORY OF PRESENT ILLNESS     Patient is a 64-year-old female presenting to urgent care with concerns for right wrist pain.  She is right dominant and has history of right wrist sprain 1 year ago.  She reports falling 2 months ago but has had increased pain for the last 2 days.  She has a [place] puppy and has some excoriations on her right dorsal wrist.  She reports pain with any movement of her right wrist.      Other significant problems:  There are no problems to display for this patient.      PAST MEDICAL, FAMILY AND SOCIAL HISTORY     Medications:  Current Outpatient Medications   Medication Sig Dispense Refill   • aspirin 81 MG EC tablet Take 81 mg by mouth daily.     • citalopram (CeleXA) 10 MG tablet Take 10 mg by mouth daily.     • Levothyroxine Sodium 125 MCG CAPS Take  by mouth.       No current facility-administered medications for this visit.       Allergies:  ALLERGIES:   Allergen Reactions   • Tetracycline GI UPSET     Stomach cramps         Past Medical  History/Surgeries:  History reviewed. No pertinent past medical history.    Past Surgical History:   Procedure Laterality Date   • Belpharoptosis repair         Family History:  History reviewed. No pertinent family history.    Social History:  Social History     Tobacco Use   • Smoking status: Never   • Smokeless tobacco: Never   Substance Use Topics   • Alcohol use: Not on file       REVIEW OF SYSTEMS     Review of Systems   Constitutional: Negative for activity change, chills, diaphoresis and fever.   HENT: Negative for sore throat and trouble swallowing.    Eyes: Negative for pain, discharge,  Pt called this morning reporting she discussed betablockers with Sabas JOSEPH for the palpitations that she is experiencing. She's interested in proceeding with starting whichever one Sabas recommends if she can take it in conjunction with the Provigial 100 mg that she was prescribed for her sleep disorder.    redness and visual disturbance.   Respiratory: Negative for cough and shortness of breath.    Cardiovascular: Negative for chest pain, palpitations and leg swelling.   Gastrointestinal: Negative for abdominal pain, diarrhea, nausea and vomiting.   Endocrine: Negative for polydipsia.   Genitourinary: Negative for dysuria, frequency and urgency.   Musculoskeletal: Positive for arthralgias. Negative for joint swelling and myalgias.   Skin: Negative for rash.   Allergic/Immunologic: Negative for immunocompromised state.   Neurological: Negative for weakness, numbness and headaches.   Psychiatric/Behavioral: Negative for behavioral problems.       PHYSICAL EXAM     Physical Exam  Vitals and nursing note reviewed.   Constitutional:       General: She is not in acute distress.     Appearance: Normal appearance. She is well-developed and normal weight. She is not ill-appearing, toxic-appearing or diaphoretic.   HENT:      Head: Normocephalic and atraumatic.   Cardiovascular:      Rate and Rhythm: Normal rate and regular rhythm.      Heart sounds: Normal heart sounds.   Pulmonary:      Effort: Pulmonary effort is normal. No respiratory distress.      Breath sounds: Normal breath sounds.   Musculoskeletal:         General: Normal range of motion.      Right wrist: Tenderness present. No swelling, deformity, bony tenderness, snuff box tenderness or crepitus. Normal range of motion. Normal pulse.      Right hand: Normal.   Skin:     General: Skin is warm and dry.   Neurological:      Mental Status: She is alert and oriented to person, place, and time.      Cranial Nerves: No cranial nerve deficit.      Coordination: Coordination normal.         ASSESSMENT/PLAN     Pain and swelling of right wrist  (primary encounter diagnosis)  Plan: XR WRIST 3 OR MORE VIEWS RIGHT, WRIST BRACE         WITH THUMB SPICA      1. Discussed with patient xray showing no evidence of fracture or dislocation.  She is currently on prednisone for an  erythema nodosum flare.  Discussed adding Tylenol and ibuprofen for pain relief.  2. Offered physical therapy.  She would like to wait to see her orthopedist for evaluation in 1 week before starting PT.  3. Ordered wrist splint and recommended ice.

## 2024-07-31 RX ORDER — MODAFINIL 100 MG/1
50 TABLET ORAL DAILY
COMMUNITY

## 2024-07-31 RX ORDER — PROPRANOLOL HYDROCHLORIDE 10 MG/1
10 TABLET ORAL 2 TIMES DAILY
COMMUNITY

## 2024-07-31 NOTE — TELEPHONE ENCOUNTER
Spoke to patient she stated PCP has sent in Propanolol 10 mg BID to try.    Per Sabas Rodriguez PAC - she can try that if that does not work then we can move forward with the Nebivolo 2.5 mg daily       Patient verbalized understanding

## 2024-09-03 ENCOUNTER — TELEPHONE (OUTPATIENT)
Dept: CARDIOLOGY | Facility: CLINIC | Age: 24
End: 2024-09-03
Payer: COMMERCIAL

## 2024-09-03 NOTE — TELEPHONE ENCOUNTER
Per chart review, Sabas has not prescribed her any medication. She can contact PCP or prescriber.       Called pt, relayed the above. She stated she wants him to write a letter stating she can't be outside for her heart. Stated that she's in a day program and that she tends to overheat and ends up in the hospital. Stated that she's at [facility] and that they don't seem to understand when she gets hot easily. She stated she needs a letter stating she has tachycardia and that it's worsened when she gets hot.

## 2024-09-03 NOTE — TELEPHONE ENCOUNTER
Caller: Martina Jarvis    Relationship: Self    Best call back number: [phone]    What is the best time to reach you: ANYTIME    What was the call regarding:   PATIENT WOULD LIKE MICHAEL MUSA TO WRITE A DOCTORS NOTE OR LETTER THAT SHE CAN  TOMORROW OR THURSDAY STATING THAT WITH THE MEDICATION SHE IS TAKING THAT SHE CANNOT BE OUTSIDE IN THE HEAT TOO LONG BECAUSE IT WILL INCREASE HER HEART RATE. PATIENT STATES SHE IS ALSO ON STIMULANT AND ANTIDEPRESSANTS AND THOSE CAN IMPACT HER HEART RATE IN THE HEAT AS WELL WITH HER HEART MEDICATIONS. PATIENT WOULD LIKE FOR MICHAEL TO PUT THAT SHE CAN ONLY STAY OUTSIDE OF 30 MINUTES OR SO, NOT 9-10 HOURS.

## 2024-09-05 ENCOUNTER — TELEPHONE (OUTPATIENT)
Dept: CARDIOLOGY | Facility: CLINIC | Age: 24
End: 2024-09-05
Payer: COMMERCIAL

## 2024-09-05 NOTE — TELEPHONE ENCOUNTER
Caller: Martina Jarvis    Relationship: Self    Best call back number: [phone]     What is the best time to reach you: ANYTIME    Who are you requesting to speak with (clinical staff, provider,  specific staff member): PROVIDER    What was the call regarding: PT REPORTS BEING SCARED AFTER TAKING ANTIDEPRESSANTS. PT REPORTS 5 PALPITATIONS IN 2 MINUTES. PT MENTIONED BEING SCARED OF DYING. SHE HAS NEVER HAD PALPITATIONS BEFORE AND IS HAVING ACTIVE SYMPTOMS. HUB WAS ABLE TO WT BUT PT DOES WANT A CALL BACK FROM AZRA RUCKER    Is it okay if the provider responds through MyChart: NO

## 2024-09-05 NOTE — TELEPHONE ENCOUNTER
Per Sabas garcia nurse visit for EKG. Patient scheduled for nurse visit tomorrow at 10:30 am for EKG, patient aware of appointment, when I spoke with patient she reports was stopping and starting her wellbutrin and provigil I advised her that she needed to contact prescribing provider for any issues with her antidepressants. Patient verbalizes understanding.

## 2024-09-05 NOTE — TELEPHONE ENCOUNTER
Patient called in said that she had palpitation every multiple times in a few minutes. She gets fast heart beat and chest hurts. No dizziness No SOB . She said that it was happening every minute or two for 5 minutes. She is taking Provigual and Welbutrin . It made her feel really bad . She did not take provigual today but did take Welbutrin. She is not sure if this is causing her issue. She does not have a way to take her blood pressure and does not think her pulse ox is aurate. I did advise patient to go to the ER if stymptoms worsen .

## 2024-09-06 ENCOUNTER — CLINICAL SUPPORT (OUTPATIENT)
Dept: CARDIOLOGY | Facility: CLINIC | Age: 24
End: 2024-09-06
Payer: COMMERCIAL

## 2024-09-06 VITALS
BODY MASS INDEX: 38.68 KG/M2 | DIASTOLIC BLOOD PRESSURE: 81 MMHG | SYSTOLIC BLOOD PRESSURE: 124 MMHG | HEIGHT: 60 IN | OXYGEN SATURATION: 99 % | WEIGHT: 197 LBS | HEART RATE: 92 BPM

## 2024-09-06 DIAGNOSIS — R00.2 PALPITATIONS: Primary | ICD-10-CM

## 2024-09-06 DIAGNOSIS — R06.02 SHORTNESS OF BREATH: ICD-10-CM

## 2024-09-06 DIAGNOSIS — I47.10 PAROXYSMAL SVT (SUPRAVENTRICULAR TACHYCARDIA): ICD-10-CM

## 2024-09-06 DIAGNOSIS — R42 DIZZINESS: ICD-10-CM

## 2024-09-06 DIAGNOSIS — R00.0 TACHYCARDIA: ICD-10-CM

## 2024-09-06 PROCEDURE — 93000 ELECTROCARDIOGRAM COMPLETE: CPT | Performed by: PHYSICIAN ASSISTANT

## 2024-09-06 PROCEDURE — 99211 OFF/OP EST MAY X REQ PHY/QHP: CPT | Performed by: PHYSICIAN ASSISTANT

## 2024-09-06 RX ORDER — ASPIRIN 81 MG/1
81 TABLET ORAL DAILY
Qty: 90 TABLET | Refills: 3 | Status: SHIPPED | OUTPATIENT
Start: 2024-09-06

## 2024-09-06 RX ORDER — NEBIVOLOL 2.5 MG/1
2.5 TABLET ORAL DAILY
Qty: 90 TABLET | Refills: 3 | Status: SHIPPED | OUTPATIENT
Start: 2024-09-06

## 2024-09-11 ENCOUNTER — TELEPHONE (OUTPATIENT)
Dept: CARDIOLOGY | Facility: CLINIC | Age: 24
End: 2024-09-11
Payer: COMMERCIAL

## 2024-09-11 NOTE — TELEPHONE ENCOUNTER
Caller: Martina Jarvis    Relationship: Self    Best call back number: [phone]      What is the best time to reach you: ANYTIME    Who are you requesting to speak with (clinical staff, provider,  specific staff member): CLINICAL    Do you know the name of the person who called: N/A    What was the call regarding: PATIENT IS CALLING TO SPEAK TO MOON AND HAS A FEW QUESTIONS.  SIDE EFFECTS OF THE  NEBIVOLOL 2.5 MG AND  ASPIRIN 81 MG  AND WOULD AZRA RUCKER; PREFER HER TO TAKE IN THE MORNING OR EVENING. PATIENT WOULD ALSO LIKE TO KNOW WHEN HER STRESS TEST IS SCHEDULED FOR.  2. PATIENT HAS BE PRESCRIBED PRISTIQ 25 MG DAILY.  WILL THESE MEDICATION BE OK TO TAKE  ALL TOGETHER?    Is it okay if the provider responds through MyChart: YES

## 2024-09-11 NOTE — TELEPHONE ENCOUNTER
Called patient instructed her okay to take nebivolol and aspirin in the morning. Patient advised to check with pharmacy as to concerns for drug interaction's with her antidepressant she verbalizes understanding of instruction's.

## 2024-09-12 ENCOUNTER — LAB (OUTPATIENT)
Dept: LAB | Facility: HOSPITAL | Age: 24
End: 2024-09-12
Payer: COMMERCIAL

## 2024-09-12 DIAGNOSIS — R42 DIZZINESS: ICD-10-CM

## 2024-09-12 DIAGNOSIS — R06.02 SHORTNESS OF BREATH: ICD-10-CM

## 2024-09-12 DIAGNOSIS — R00.2 PALPITATIONS: ICD-10-CM

## 2024-09-12 DIAGNOSIS — R00.0 TACHYCARDIA: ICD-10-CM

## 2024-09-12 DIAGNOSIS — I47.10 PAROXYSMAL SVT (SUPRAVENTRICULAR TACHYCARDIA): ICD-10-CM

## 2024-09-12 LAB
ANION GAP SERPL CALCULATED.3IONS-SCNC: 11 MMOL/L (ref 5–15)
BASOPHILS # BLD AUTO: 0.04 10*3/MM3 (ref 0–0.2)
BASOPHILS NFR BLD AUTO: 0.5 % (ref 0–1.5)
BUN SERPL-MCNC: 7 MG/DL (ref 6–20)
BUN/CREAT SERPL: 9.9 (ref 7–25)
CALCIUM SPEC-SCNC: 9.4 MG/DL (ref 8.6–10.5)
CHLORIDE SERPL-SCNC: 102 MMOL/L (ref 98–107)
CO2 SERPL-SCNC: 21 MMOL/L (ref 22–29)
CREAT SERPL-MCNC: 0.71 MG/DL (ref 0.57–1)
DEPRECATED RDW RBC AUTO: 43.5 FL (ref 37–54)
EGFRCR SERPLBLD CKD-EPI 2021: 124.2 ML/MIN/1.73
EOSINOPHIL # BLD AUTO: 0.18 10*3/MM3 (ref 0–0.4)
EOSINOPHIL NFR BLD AUTO: 2.4 % (ref 0.3–6.2)
ERYTHROCYTE [DISTWIDTH] IN BLOOD BY AUTOMATED COUNT: 14.1 % (ref 12.3–15.4)
GLUCOSE SERPL-MCNC: 105 MG/DL (ref 65–99)
HCT VFR BLD AUTO: 37.1 % (ref 34–46.6)
HGB BLD-MCNC: 11.9 G/DL (ref 12–15.9)
IMM GRANULOCYTES # BLD AUTO: 0.03 10*3/MM3 (ref 0–0.05)
IMM GRANULOCYTES NFR BLD AUTO: 0.4 % (ref 0–0.5)
LYMPHOCYTES # BLD AUTO: 2.75 10*3/MM3 (ref 0.7–3.1)
LYMPHOCYTES NFR BLD AUTO: 35.9 % (ref 19.6–45.3)
MCH RBC QN AUTO: 27.1 PG (ref 26.6–33)
MCHC RBC AUTO-ENTMCNC: 32.1 G/DL (ref 31.5–35.7)
MCV RBC AUTO: 84.5 FL (ref 79–97)
MONOCYTES # BLD AUTO: 0.41 10*3/MM3 (ref 0.1–0.9)
MONOCYTES NFR BLD AUTO: 5.4 % (ref 5–12)
NEUTROPHILS NFR BLD AUTO: 4.24 10*3/MM3 (ref 1.7–7)
NEUTROPHILS NFR BLD AUTO: 55.4 % (ref 42.7–76)
NRBC BLD AUTO-RTO: 0 /100 WBC (ref 0–0.2)
PLATELET # BLD AUTO: 407 10*3/MM3 (ref 140–450)
PMV BLD AUTO: 9.6 FL (ref 6–12)
POTASSIUM SERPL-SCNC: 3.9 MMOL/L (ref 3.5–5.2)
RBC # BLD AUTO: 4.39 10*6/MM3 (ref 3.77–5.28)
SODIUM SERPL-SCNC: 134 MMOL/L (ref 136–145)
TROPONIN T SERPL HS-MCNC: <6 NG/L
TSH SERPL DL<=0.05 MIU/L-ACNC: 1.12 UIU/ML (ref 0.27–4.2)
WBC NRBC COR # BLD AUTO: 7.65 10*3/MM3 (ref 3.4–10.8)

## 2024-09-12 PROCEDURE — 80048 BASIC METABOLIC PNL TOTAL CA: CPT

## 2024-09-12 PROCEDURE — 84484 ASSAY OF TROPONIN QUANT: CPT

## 2024-09-12 PROCEDURE — 84443 ASSAY THYROID STIM HORMONE: CPT

## 2024-09-12 PROCEDURE — 36415 COLL VENOUS BLD VENIPUNCTURE: CPT

## 2024-09-12 PROCEDURE — 85025 COMPLETE CBC W/AUTO DIFF WBC: CPT

## 2024-09-13 ENCOUNTER — TELEPHONE (OUTPATIENT)
Dept: CARDIOLOGY | Facility: CLINIC | Age: 24
End: 2024-09-13
Payer: COMMERCIAL

## 2024-09-13 NOTE — TELEPHONE ENCOUNTER
Caller: Martina Jarvis    Relationship: Self    Best call back number: [phone]     Caller requesting test results: SELF     What test was performed: BLOOD WORK     When was the test performed: 9/12/24    Where was the test performed: IN OFFICE BASEMENT     Additional notes: SHE GOT THESE ON MYCHART, SHE IS CONCERNED ABOUT THESE. WOULD LIKE TO DISCUSS WITH CLINICAL.

## 2024-09-13 NOTE — TELEPHONE ENCOUNTER
Labs printed for sabas to review,     Labs reviewed with Sabas garcia with no new recommendation's.     Patient notified of result's.

## 2024-10-07 ENCOUNTER — TELEPHONE (OUTPATIENT)
Dept: CARDIOLOGY | Facility: CLINIC | Age: 24
End: 2024-10-07
Payer: COMMERCIAL

## 2024-10-07 NOTE — TELEPHONE ENCOUNTER
"Called patient who reports that heart rate is running 80 to 120's and is wanting to get stress test done as soon as possible also is wanting \"cardiac ablation if possible\" states I'm taking my beta blocker as prescribed nebivolol 2.5 mg daily states does not know what her blood pressure is because she does not have equipment to check blood pressure. States was hoping to see Sabas OQUENDO sooner states has fast heart rate, dizzy , short of breath, chest pain at times but not all the time. Patient advised to go to ER if experiencing chest pain she declines. Will forward to another provider in absence of Sabas OQUENDO .   "

## 2024-10-07 NOTE — TELEPHONE ENCOUNTER
Caller: Martina Jarvis    Relationship: Self    Best call back number: [phone]    What is the best time to reach you: ANY      What was the call regarding: PATIENT ADVISING HER HEART RATE HAS BEEN GETTING OVER 100. SHE IS EXPERIENCING DIZZINESS, SOB. NO CHEST PAINS CURRENTLY. PATIENT STATED SHE IS NOT GOING TO THE EMERGENCY ROOM. PLEASE CALL THE PATIENT ASAP TO DISCUSS/ADVISE.     Is it okay if the provider responds through MyChart: NO

## 2024-10-07 NOTE — TELEPHONE ENCOUNTER
Attempted to reach patient - no answer - VM was left for patient to proceed to the ER with any ACTIVE CHEST PAIN.       Was able to reach patient mother ( [place] [employer] ) she is a wear of what is going on and she is leaving work to go attempt to take patient to the ER . Advised the ER will be the place that can do testing on site and deal with any active issues at that time. Mother verbalized understanding - she did ask in the case they do not do testing if we can R/S the patients stress test due to this issues has been on going for 2 months - I told her I would reach out to scheduling and it would most likely be tomorrow before she hears back from them.

## 2024-10-17 ENCOUNTER — HOSPITAL ENCOUNTER (OUTPATIENT)
Dept: CARDIOLOGY | Facility: HOSPITAL | Age: 24
Discharge: HOME | End: 2024-10-17
Admitting: PHYSICIAN ASSISTANT
Payer: COMMERCIAL

## 2024-10-17 DIAGNOSIS — R06.02 SHORTNESS OF BREATH: ICD-10-CM

## 2024-10-17 DIAGNOSIS — R00.0 TACHYCARDIA: ICD-10-CM

## 2024-10-17 DIAGNOSIS — I47.10 PAROXYSMAL SVT (SUPRAVENTRICULAR TACHYCARDIA): ICD-10-CM

## 2024-10-17 DIAGNOSIS — R42 DIZZINESS: ICD-10-CM

## 2024-10-17 DIAGNOSIS — R00.2 PALPITATIONS: ICD-10-CM

## 2024-10-17 LAB
BH CV STRESS RECOVERY BP: NORMAL MMHG
BH CV STRESS RECOVERY HR: 99 BPM
MAXIMAL PREDICTED HEART RATE: 199 BPM
PERCENT MAX PREDICTED HR: 84.92 %
STRESS BASELINE BP: NORMAL MMHG
STRESS BASELINE HR: 79 BPM
STRESS PERCENT HR: 100 %
STRESS POST ESTIMATED WORKLOAD: 7 METS
STRESS POST EXERCISE DUR MIN: 6 MIN
STRESS POST PEAK BP: NORMAL MMHG
STRESS POST PEAK HR: 169 BPM
STRESS TARGET HR: 169 BPM

## 2024-10-17 PROCEDURE — 93017 CV STRESS TEST TRACING ONLY: CPT

## 2024-10-21 ENCOUNTER — TELEPHONE (OUTPATIENT)
Dept: CARDIOLOGY | Facility: CLINIC | Age: 24
End: 2024-10-21
Payer: COMMERCIAL

## 2024-10-21 NOTE — TELEPHONE ENCOUNTER
----- Message from Meg MARINO sent at 10/21/2024 10:15 AM EDT -----    ----- Message -----  From: Sabas Rodriguez PA  Sent: 10/20/2024   9:33 PM EDT  To: Hawa Bobo MA    Nondiagnostic EKG changes.  Routine follow-up.  ----- Message -----  From: Los Newby MD  Sent: 10/17/2024   2:59 PM EDT  To: AZRA Rodriguez

## 2024-10-21 NOTE — TELEPHONE ENCOUNTER
Relay    Calling to inform you there were no acute findings or abnormalities found on your stress test. Please keep your next scheduled appointment and give us a call with any new or worsening symptoms. Thank you

## 2024-10-22 ENCOUNTER — TELEPHONE (OUTPATIENT)
Dept: CARDIOLOGY | Facility: CLINIC | Age: 24
End: 2024-10-22
Payer: COMMERCIAL

## 2024-10-22 NOTE — TELEPHONE ENCOUNTER
Caller: Martina Jarvis    Relationship: Self    Best call back number: [phone]     What form or medical record are you requesting: [facility] IS NEEDING A NOTE FROM OUR OFFICE THAT SAYS PT NEEDS A NON STIMULANT MED DUE TO HEART PROBLEMS.     Who is requesting this form or medical record from you: [facility]     How would you like to receive the form or medical records (pick-up, mail, fax): FAX- ATTENTION SHEELA  If fax, what is the fax number: [phone]    Timeframe paperwork needed: ROUTINE    HUB ALSO RELAY TE FROM 10/21

## 2024-10-22 NOTE — LETTER
October 23, 2024     [facility]    Patient: Martina Jarvis   YOB: 2000   Date of Visit: 10/22/2024     Per Sabas OQUENDO ok to proceed with with non stimulant medication due to patient complaint's of heart palpitation's.

## 2024-11-05 ENCOUNTER — TELEPHONE (OUTPATIENT)
Dept: CARDIOLOGY | Facility: CLINIC | Age: 24
End: 2024-11-05
Payer: COMMERCIAL

## 2024-11-05 DIAGNOSIS — G47.11 IDIOPATHIC HYPERSOMNIA: ICD-10-CM

## 2024-11-05 DIAGNOSIS — G47.39 SLEEP APNEA-LIKE BEHAVIOR: Primary | ICD-10-CM

## 2024-11-05 NOTE — TELEPHONE ENCOUNTER
Caller: Martina Jarvis    Relationship: Self    Best call back number: [phone]    What is the best time to reach you: ANYTIME    Who are you requesting to speak with (clinical staff, provider,  specific staff member): CLINICAL    What was the call regarding: PT WAS SENT TO [facility] AND SHE HAD AN APPT 11.4.24 THERE. PT SAID SHE WAS REFUSED NON STIMULANT MEDICATIONS AND SHE FELT DISRESPECTED THERE. PT SAID THEY WERE REFERRING HER OUT TO A NEW SPECIALIST. SHE HAS BEEN SENT TO  BUT DID NOT WANT TO PURSUE THAT PROVIDER. PT WOULD LIKE TO GET A REFERRAL TO THE OFFICE OF WENDY VALENZUELA IN PULMONOLOGY FROM [facility]. THE FAX NUMBER FOR THAT OFFICE [phone]. PLEASE CALL PT TO DISCUSS.

## 2025-01-07 ENCOUNTER — TELEPHONE (OUTPATIENT)
Dept: CARDIOLOGY | Facility: CLINIC | Age: 25
End: 2025-01-07
Payer: COMMERCIAL

## 2025-01-07 NOTE — TELEPHONE ENCOUNTER
1. call mom back [phone]  2 .Martina has long covid, chronic high heart rate, it gets up to 110-120, causes her chest pain, what can we do about this?

## 2025-01-07 NOTE — TELEPHONE ENCOUNTER
Spoke to mom, she will call back to let us know what medication she is currently taking.    Sabas Rodriguez PA Hedrick, Mari-Alice, MA  Caller: Unspecified (Today,  2:39 PM)  This would have to be titration of beta-blocker therapy.  Unfortunately it looks like on her medication list she is on nebivolol and propranolol.  Please ensure that she is not.  If we cannot tolerate beta-blocker further, we can refer to EP services for consideration of Catherine.

## 2025-01-08 NOTE — TELEPHONE ENCOUNTER
Patient is taking nebivolol, she would like to try the propranolol. Okay to switch? If okay please provide strength and directions.

## 2025-01-14 RX ORDER — PROPRANOLOL HYDROCHLORIDE 10 MG/1
10 TABLET ORAL 2 TIMES DAILY
Qty: 60 TABLET | Refills: 11 | Status: SHIPPED | OUTPATIENT
Start: 2025-01-14

## 2025-02-07 ENCOUNTER — OFFICE VISIT (OUTPATIENT)
Dept: CARDIOLOGY | Facility: CLINIC | Age: 25
End: 2025-02-07
Payer: COMMERCIAL

## 2025-02-07 VITALS
HEART RATE: 82 BPM | BODY MASS INDEX: 38.87 KG/M2 | DIASTOLIC BLOOD PRESSURE: 80 MMHG | WEIGHT: 198 LBS | SYSTOLIC BLOOD PRESSURE: 114 MMHG | HEIGHT: 60 IN | OXYGEN SATURATION: 99 %

## 2025-02-07 DIAGNOSIS — R06.02 SHORTNESS OF BREATH: ICD-10-CM

## 2025-02-07 DIAGNOSIS — R00.2 PALPITATIONS: Primary | ICD-10-CM

## 2025-02-07 DIAGNOSIS — R07.2 PRECORDIAL PAIN: ICD-10-CM

## 2025-02-07 DIAGNOSIS — R00.0 TACHYCARDIA: ICD-10-CM

## 2025-02-07 PROCEDURE — 99214 OFFICE O/P EST MOD 30 MIN: CPT | Performed by: PHYSICIAN ASSISTANT

## 2025-02-07 PROCEDURE — 1160F RVW MEDS BY RX/DR IN RCRD: CPT | Performed by: PHYSICIAN ASSISTANT

## 2025-02-07 PROCEDURE — 1159F MED LIST DOCD IN RCRD: CPT | Performed by: PHYSICIAN ASSISTANT

## 2025-02-07 RX ORDER — SOLRIAMFETOL 75 MG/1
75 TABLET, FILM COATED ORAL DAILY
COMMUNITY
Start: 2025-01-01

## 2025-02-07 RX ORDER — FAMOTIDINE 40 MG/1
40 TABLET, FILM COATED ORAL NIGHTLY PRN
COMMUNITY

## 2025-02-07 RX ORDER — PROPRANOLOL HCL 20 MG
20 TABLET ORAL 2 TIMES DAILY
Qty: 60 TABLET | Refills: 5 | Status: SHIPPED | OUTPATIENT
Start: 2025-02-07

## 2025-02-07 NOTE — PROGRESS NOTES
"Problem list     Subjective   Martina Jarvis is a 24 y.o. female     Chief Complaint   Patient presents with    Follow-up     8 month follow up - patient reports has long haul covid, last had covid this past December, c/o shortness of breath, palpitation's and chest pain, reports caffeine makes it worse.    Problem list:  1.  Chronic chest pain.  1.1.  Low risk stress test, 7/2020.  2.  SVT.  2.1.  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  3.  Preserved systolic function.  4.  Anxiety.  5.  ADHD.    HPI  The patient presents in clinic today for routine evaluation and follow-up.  She was initially evaluated given history of SVT with reported ablation.  We have followed her intermittently for recurrent issues including chest pain and ongoing palpitations/tachycardia.  She did have an echocardiogram in November/December 2023.  Echocardiogram indicated normal systolic function with no significant structural abnormalities present otherwise.  She eventually had evaluation with a regular treadmill stress test in October, 2024.  Stress test indicated nondiagnostic EKG changes, not felt to meet diagnostic threshold for ischemia.  We also reviewed prior monitor, also performed in 2024.  She had an episode of nonsustained V. tach, possibly correlating with sleep hours.  She had no other rate or rhythm disturbance issues otherwise.  We have reviewed this in detail with her.    The patient continues not to do well.  She has recurrent episodes of chest pain.  Historically this was felt at least in part related to stress.  She is very concerned about the possibility of \"heart attack\".  We have encouraged her that her prior noninvasive study has been benign, and she is still very concerned about this.  She would like to do explore possibility of further evaluation.  She has ongoing palpitations and tachycardia.  We have discussed increasing propranolol therapy, and she seems interested in this.  She has pending routine laboratories " soon with her primary care provider.  We can await results of those.  She has no further complaints.    Current Outpatient Medications on File Prior to Visit   Medication Sig Dispense Refill    famotidine (PEPCID) 40 MG tablet Take 1 tablet by mouth At Night As Needed.      levonorgestrel-ethinyl estradiol (SEASONALE) 0.15-0.03 MG per tablet Take 1 tablet by mouth Daily.      Solriamfetol HCl (Sunosi) 75 MG tablet Take 1 tablet by mouth Daily.      aspirin 81 MG EC tablet Take 1 tablet by mouth Daily. (Patient not taking: Reported on 2/7/2025) 90 tablet 3     No current facility-administered medications on file prior to visit.       Patient has no known allergies.    Past Medical History:   Diagnosis Date    Abnormal ECG 4 years ago    ADD (attention deficit disorder)     ADHD     Anxiety     Arrhythmia     COVID-19 12/08/2023    COVID-19 vaccine administered 1st- 08/2021    2nd - 10/2021 - Moderna     Depression     SVT (supraventricular tachycardia)        Social History     Socioeconomic History    Marital status: Single   Tobacco Use    Smoking status: Never    Smokeless tobacco: Never   Substance and Sexual Activity    Alcohol use: Never    Drug use: Never    Sexual activity: Not Currently     Partners: Male     Birth control/protection: Birth control pill       Family History   Problem Relation Age of Onset    Diabetes Mother     Hypertension Mother     Bipolar disorder Mother     Hypothyroidism Mother     No Known Problems Father        Review of Systems   Constitutional: Negative.  Positive for fatigue. Negative for chills, diaphoresis and fever.   HENT: Negative.  Negative for hearing loss.    Eyes: Negative.    Respiratory: Negative.  Positive for shortness of breath. Negative for apnea, cough, chest tightness and wheezing.    Cardiovascular: Negative.  Positive for chest pain and palpitations. Negative for leg swelling.   Gastrointestinal: Negative.  Positive for abdominal pain and blood in stool.  "  Endocrine: Negative.  Negative for cold intolerance and heat intolerance.   Genitourinary: Negative.  Positive for hematuria.   Musculoskeletal: Negative.  Positive for back pain. Negative for arthralgias, myalgias, neck pain and neck stiffness.   Skin: Negative.  Negative for rash and wound.   Allergic/Immunologic: Negative.  Negative for environmental allergies and food allergies.   Neurological: Negative.  Positive for dizziness and light-headedness. Negative for syncope, weakness, numbness and headaches.   Hematological: Negative.  Does not bruise/bleed easily.   Psychiatric/Behavioral: Negative.  Negative for sleep disturbance.        Objective   Vitals:    02/07/25 1127   BP: 114/80   Pulse: 82   SpO2: 99%   Weight: 89.8 kg (198 lb)   Height: 152.4 cm (60\")      /80   Pulse 82   Ht 152.4 cm (60\")   Wt 89.8 kg (198 lb)   SpO2 99%   BMI 38.67 kg/m²    Lab Results (most recent)       None          Physical Exam  Vitals and nursing note reviewed.   Constitutional:       General: She is not in acute distress.     Appearance: She is well-developed.   HENT:      Head: Normocephalic and atraumatic.   Eyes:      Conjunctiva/sclera: Conjunctivae normal.      Pupils: Pupils are equal, round, and reactive to light.   Neck:      Vascular: No JVD.      Trachea: No tracheal deviation.   Cardiovascular:      Rate and Rhythm: Normal rate and regular rhythm.      Heart sounds: Normal heart sounds.   Pulmonary:      Effort: Pulmonary effort is normal.      Breath sounds: Normal breath sounds.   Abdominal:      General: Bowel sounds are normal. There is no distension.      Palpations: Abdomen is soft. There is no mass.      Tenderness: There is no abdominal tenderness. There is no guarding or rebound.   Musculoskeletal:         General: No tenderness or deformity. Normal range of motion.      Cervical back: Normal range of motion and neck supple.   Skin:     General: Skin is warm and dry.      Coloration: Skin is not " pale.      Findings: No erythema or rash.   Neurological:      Mental Status: She is alert and oriented to person, place, and time.   Psychiatric:         Behavior: Behavior normal.         Thought Content: Thought content normal.         Judgment: Judgment normal.           Procedure   Procedures       Assessment & Plan      Diagnosis Plan   1. Palpitations  Adult Stress Echo W/ Cont or Stress Agent if Necessary Per Protocol      2. Tachycardia  Adult Stress Echo W/ Cont or Stress Agent if Necessary Per Protocol      3. Precordial pain  Adult Stress Echo W/ Cont or Stress Agent if Necessary Per Protocol      4. Shortness of breath  Adult Stress Echo W/ Cont or Stress Agent if Necessary Per Protocol          1.  The patient presents in for routine evaluation and follow-up.  She has ongoing chest pain and palpitations/tachycardia, which is her main concern.  She had a recent regular treadmill stress test and echo.  Those studies were benign.  She is still very concerned about chest pain and would like to discuss further cardiac evaluation.    2.  We will schedule for stress echo for further evaluation.  If benign, nothing further would be indicated.  We have explored and reviewed this in detail with her.    3.  I would increase propranolol to 10 mg twice a day dosing, as she only takes it daily currently.  She will monitor blood pressure and heart rates and call for any any issues.  Hopefully she will have some degree of suppression of symptoms with increased.    4.  No further adjustments in medical regimen will be made.    5.  We will continue to see the patient routinely through the clinic.  We will see her back for ongoing symptoms, abnormalities in stress echo findings, or continued tachycardia/palpitations despite increase in propranolol.                 Electronically signed by:

## 2025-02-27 ENCOUNTER — TELEPHONE (OUTPATIENT)
Dept: CARDIOLOGY | Facility: CLINIC | Age: 25
End: 2025-02-27
Payer: COMMERCIAL

## 2025-02-27 DIAGNOSIS — R07.2 PRECORDIAL PAIN: Primary | ICD-10-CM

## 2025-02-27 NOTE — TELEPHONE ENCOUNTER
Chen from [facility] called the office stating that the patient had showed up over there requesting to be seen. She states they cannot see patients without referrals. I explained to Chen that Sabas Rodriguez does not have a order in for cardiac rehab so we would have to discuss this with him first. Once the order is input our referral coordinator can start the referral process to cardiac rehab.

## 2025-03-05 ENCOUNTER — HOSPITAL ENCOUNTER (OUTPATIENT)
Dept: CARDIOLOGY | Facility: HOSPITAL | Age: 25
Discharge: HOME | End: 2025-03-05
Admitting: PHYSICIAN ASSISTANT
Payer: COMMERCIAL

## 2025-03-05 DIAGNOSIS — R00.2 PALPITATIONS: ICD-10-CM

## 2025-03-05 DIAGNOSIS — R00.0 TACHYCARDIA: ICD-10-CM

## 2025-03-05 DIAGNOSIS — R07.2 PRECORDIAL PAIN: ICD-10-CM

## 2025-03-05 DIAGNOSIS — R06.02 SHORTNESS OF BREATH: ICD-10-CM

## 2025-03-05 LAB
BH CV STRESS DURATION MIN STAGE 1: 3
BH CV STRESS DURATION SEC STAGE 1: 0
BH CV STRESS GRADE STAGE 1: 10
BH CV STRESS METS STAGE 1: 5
BH CV STRESS PROTOCOL 1: NORMAL
BH CV STRESS RECOVERY BP: NORMAL MMHG
BH CV STRESS RECOVERY HR: 102 BPM
BH CV STRESS SPEED STAGE 1: 1.7
BH CV STRESS STAGE 1: 1
MAXIMAL PREDICTED HEART RATE: 198 BPM
PERCENT MAX PREDICTED HR: 83.84 %
STRESS BASELINE BP: NORMAL MMHG
STRESS BASELINE HR: 102 BPM
STRESS PERCENT HR: 99 %
STRESS POST ESTIMATED WORKLOAD: 7 METS
STRESS POST EXERCISE DUR MIN: 6 MIN
STRESS POST PEAK BP: NORMAL MMHG
STRESS POST PEAK HR: 166 BPM
STRESS TARGET HR: 168 BPM

## 2025-03-05 PROCEDURE — 93351 STRESS TTE COMPLETE: CPT

## 2025-03-05 PROCEDURE — 93320 DOPPLER ECHO COMPLETE: CPT

## 2025-03-05 PROCEDURE — 93325 DOPPLER ECHO COLOR FLOW MAPG: CPT

## 2025-03-10 ENCOUNTER — TELEPHONE (OUTPATIENT)
Dept: CARDIOLOGY | Facility: CLINIC | Age: 25
End: 2025-03-10
Payer: COMMERCIAL

## 2025-03-10 DIAGNOSIS — R07.89 OTHER CHEST PAIN: Primary | ICD-10-CM

## 2025-03-11 ENCOUNTER — TELEPHONE (OUTPATIENT)
Dept: CARDIOLOGY | Facility: CLINIC | Age: 25
End: 2025-03-11
Payer: COMMERCIAL

## 2025-03-11 NOTE — TELEPHONE ENCOUNTER
Chen from [facility] called to report the pt cannot proceed with cardiac rehab with the diagnosis on the referral.  Pt msut have stable agina

## 2025-03-11 NOTE — ADDENDUM NOTE
Addended by: JUAN ANTONIO LOPES on: 3/11/2025 07:54 AM     Modules accepted: Orders     Private Auto Walk in

## 2025-03-14 ENCOUNTER — TELEPHONE (OUTPATIENT)
Dept: CARDIOLOGY | Facility: CLINIC | Age: 25
End: 2025-03-14

## 2025-03-14 NOTE — TELEPHONE ENCOUNTER
Per Sabas reviewing the [facility] records, he stated that waiting for her stress/echo to be read with our office, then will give recommendations. [facility] records for 3/9/25 have been scanned into pt's chart.

## 2025-03-14 NOTE — TELEPHONE ENCOUNTER
Caller: Martina Jarvis     Relationship: Self     Best call back number: [phone]     What is the best time to reach you: ANYTIME    Who are you requesting to speak with (clinical staff, provider,  specific staff member): PROVIDER    Do you know the name of the person who called: NA    What was the call regarding:       PT NEEDS SOONER APPT. WENT TO ED 3.9.25 WITH CHEST PAIN. DID NOT GET TREATED WELL AS REPORTED. TOLD PT SHE HAD ANXIETY. SHE REPORTS THIS WAS NOT ANXIETY, SHE WOKE UP WITH HORRIBLE CHEST PAIN. PT REPORTS CHEST PAIN IS INCREASING AND REPORTS SHE FEELS SCARED AND IS HAVING A HEART ATTACK. PT HAS LONG COVID AS REPORTED. PULSE WAS NORMAL, PT WAS HAVING 8/10 CHEST PAIN ON SCALE. WANTS ECHO RESULTS AS WELL. WANTS TO KNOW WHAT RISK OF HEART DISEASE AND HEART ATTACK IS. WOULD LIKE TO INQUIRE ABOUT MEDICATION, IE CALCIUM BLOCKER. PT REPORTS COULD NOT SLEEP AND THEN FELT BLOWN OFF AT THE ER.

## 2025-03-17 NOTE — TELEPHONE ENCOUNTER
S/W PT SHE WAS ASKING WHEN [facility] FUP WOULD BE SCHEDULED. I DID LET HER KNOW THAT MICHAEL REVIEWED THE HOSPITAL RECORDS, HE IS WAITING FOR THE ECHO RESULTS TO BE READ BY DR SPARROW. ONCE THAT IS READ HE WILL MAKE HIS RECOMMENDATIONS. I DID ALSO TELL HER THAT [facility] STATES HOW THE CARDIAC REH[facility] WORKS. PER [facility]ER[facility]D ONCE THEY RELEASE A CARDIAC REHAB PT THEN THEY SCHEDULE ANOTHER PT. PATIENT VOICED UNDERSTANDING.

## 2025-03-19 ENCOUNTER — TELEPHONE (OUTPATIENT)
Dept: CARDIOLOGY | Facility: CLINIC | Age: 25
End: 2025-03-19
Payer: COMMERCIAL

## 2025-03-19 NOTE — TELEPHONE ENCOUNTER
Caller: Martina Jarvis    Relationship: Self    Best call back number: [phone]    What is the best time to reach you: ANYTIME    What was the call regarding: PATIENT WOULD LIKE TO DISCUSS WITH MICHAEL ABOUT STARTING CALCIUM CHANNEL BLOCKERS. PATIENT REPORTS THAT HER PALPITATIONS AND FLUTTERING ARE GETTING WORSE EVERY DAY AND SHE IS SCARED BECAUSE THAT FEELS LIKE A HEART ATTACK.

## 2025-03-24 ENCOUNTER — TELEPHONE (OUTPATIENT)
Dept: CARDIOLOGY | Facility: CLINIC | Age: 25
End: 2025-03-24
Payer: COMMERCIAL

## 2025-03-24 ENCOUNTER — RESULTS FOLLOW-UP (OUTPATIENT)
Dept: CARDIOLOGY | Facility: HOSPITAL | Age: 25
End: 2025-03-24
Payer: COMMERCIAL

## 2025-03-24 RX ORDER — DILTIAZEM HYDROCHLORIDE 30 MG/1
30 TABLET, FILM COATED ORAL 2 TIMES DAILY
Qty: 180 TABLET | Refills: 3 | Status: SHIPPED | OUTPATIENT
Start: 2025-03-24

## 2025-03-24 NOTE — TELEPHONE ENCOUNTER
Sabas Rodriguez PA to Me (Selected Message)      3/24/25  3:18 PM  Her risk of MI is low but not 0.  I discussed calcium channel blocker with somebody on the specific patient recently.  I am not sure if that has been sent in or not.  That would either be with Sosa or with Hawa.

## 2025-03-24 NOTE — TELEPHONE ENCOUNTER
Sabas Rodriguez PA to Me (Selected Message)      3/20/25  4:38 PM  Okay.  Is the patient try Cardizem or verapamil historically?  If not we will likely attempt this.

## 2025-03-24 NOTE — TELEPHONE ENCOUNTER
----- Message from Catherine LANGE sent at 3/24/2025  8:21 AM EDT -----    ----- Message -----  From: Sabas Rodriguez PA  Sent: 3/23/2025   8:32 PM EDT  To: Catherine Scott MA    Routine follow-up.  ----- Message -----  From: Los Newby MD  Sent: 3/19/2025   1:12 PM EDT  To: AZRA Rodriguez      Adult Stress Echo W/ Cont or Stress Agent if Necessary Per Protocol  Order: [id number]   Status: Final result       Dx: Palpitations; Shortness of breath; Pr...    Test Result Released: Yes (seen)    Details    Reading Physician Reading Date Result Priority   Los Newby MD  [phone]  3/19/2025 Routine     Result Text  1.  Adequate but below average functional capacity without chest pain.  The patient exercised 6 minutes on a Edison protocol to a heart rate of 166, systolic blood pressure 134, and O2 consumption of 7 METS, and to 83% of age-adjusted maximum predicted heart rate.  No angina was reported.     2.  Baseline EKG demonstrated a sinus mechanism at 83 bpm with nonspecific T wave changes.  No diagnostic ST segment changes were recorded.  No exercise-induced ectopy.     3.  Baseline echo images demonstrate normal LV systolic function with no focal wall motion abnormalities.  At peak exercise there is an increase in global ejection fraction and decrease in left ventricular end-systolic dimensions with no focal wall motion abnormalities indicative of ischemia.     Summary: No electrocardiographic or echocardiographic evidence of ischemia.  Other as above.      Patient notified of result's and recommendation's to keep routine follow up.

## 2025-03-24 NOTE — TELEPHONE ENCOUNTER
Caller: Martina Jarvis    Relationship: Self    Best call back number: [phone]    What is the best time to reach you: ANYTIME    What was the call regarding: PATIENT WOULD LIKE TO KNOW WHAT HER RISK OF HAVING A HEART ATTACK IS AND OF CARDIOVASCULAR DISEASE. PATIENT WOULD ALSO LIKE TO KNOW IF THE CALCIUM CHANNEL BLOCKER IS BEING SENT IN AND HOW SHE SHOULD TAKE THAT.

## 2025-03-24 NOTE — TELEPHONE ENCOUNTER
Sabas Rodriguez PA to Me (Selected Message)      3/24/25  3:20 PM  I will consider diltiazem 30 mg twice a day.

## 2025-03-27 NOTE — TELEPHONE ENCOUNTER
Per s/w Sabas he reviewed her hospital records and referral was sent for cardiac referral as pt requested. Aime Obregon pt can keep her appt in nov.

## (undated) DEVICE — LEX ELECTRO PHYSIOLOGY: Brand: MEDLINE INDUSTRIES, INC.

## (undated) DEVICE — LIMB HOLDER, WRIST/ANKLE: Brand: DEROYAL

## (undated) DEVICE — SET PRIMARY GRVTY 10DP MALE LL 104IN

## (undated) DEVICE — ADULT, W/LG. BACK PAD, RADIOTRANSPARENT ELEMENT AND LEAD WIRE: Brand: DEFIBRILLATION ELECTRODES

## (undated) DEVICE — DRSNG SURESITE WNDW 2.38X2.75

## (undated) DEVICE — INTRO SHEATH FASTCATH SRO .038IN 8.5F 63CM

## (undated) DEVICE — CATH QUAD CRD 6F5MM

## (undated) DEVICE — Device: Brand: EZ STEER NAV

## (undated) DEVICE — Device: Brand: WEBSTER

## (undated) DEVICE — DRSNG SURESITE123 4X4.8IN

## (undated) DEVICE — INTRO SHEATH ENGAGE W/50 GW .038 7F12

## (undated) DEVICE — ST EXT IV SMARTSITE 2VLV SP M LL 5ML IV1

## (undated) DEVICE — ST INF PRI SMRTSTE 20DRP 2VLV 24ML 117

## (undated) DEVICE — SKIN PREP TRAY W/CHG: Brand: MEDLINE INDUSTRIES, INC.

## (undated) DEVICE — DECANT BG O JET

## (undated) DEVICE — Device: Brand: REFERENCE PATCH CARTO 3

## (undated) DEVICE — CANN NASL CO2 DIVIDED A/